# Patient Record
Sex: MALE | Race: BLACK OR AFRICAN AMERICAN | NOT HISPANIC OR LATINO | Employment: OTHER | URBAN - METROPOLITAN AREA
[De-identification: names, ages, dates, MRNs, and addresses within clinical notes are randomized per-mention and may not be internally consistent; named-entity substitution may affect disease eponyms.]

---

## 2017-11-05 ENCOUNTER — HOSPITAL ENCOUNTER (INPATIENT)
Facility: OTHER | Age: 82
LOS: 2 days | Discharge: HOME HEALTH | DRG: 682 | End: 2017-11-07
Attending: EMERGENCY MEDICINE | Admitting: INTERNAL MEDICINE
Payer: MEDICARE

## 2017-11-05 DIAGNOSIS — F02.818 DEMENTIA ASSOCIATED WITH OTHER UNDERLYING DISEASE WITH BEHAVIORAL DISTURBANCE: Primary | ICD-10-CM

## 2017-11-05 DIAGNOSIS — E87.0 HYPERNATREMIA: ICD-10-CM

## 2017-11-05 DIAGNOSIS — E86.0 DEHYDRATION: ICD-10-CM

## 2017-11-05 DIAGNOSIS — N28.9 RENAL INSUFFICIENCY: ICD-10-CM

## 2017-11-05 DIAGNOSIS — R53.83 FATIGUE: ICD-10-CM

## 2017-11-05 LAB
ALBUMIN SERPL BCP-MCNC: 3 G/DL
ALP SERPL-CCNC: 64 U/L
ALT SERPL W/O P-5'-P-CCNC: 21 U/L
ANION GAP SERPL CALC-SCNC: 10 MMOL/L
AST SERPL-CCNC: 19 U/L
BASOPHILS # BLD AUTO: 0 K/UL
BASOPHILS NFR BLD: 0 %
BILIRUB SERPL-MCNC: 0.6 MG/DL
BILIRUB UR QL STRIP: NEGATIVE
BUN SERPL-MCNC: 59 MG/DL
CALCIUM SERPL-MCNC: 9.6 MG/DL
CHLORIDE SERPL-SCNC: 117 MMOL/L
CLARITY UR: ABNORMAL
CO2 SERPL-SCNC: 25 MMOL/L
COLOR UR: YELLOW
CREAT SERPL-MCNC: 2.5 MG/DL
DIFFERENTIAL METHOD: ABNORMAL
EOSINOPHIL # BLD AUTO: 0.1 K/UL
EOSINOPHIL NFR BLD: 1.7 %
ERYTHROCYTE [DISTWIDTH] IN BLOOD BY AUTOMATED COUNT: 12.5 %
EST. GFR  (AFRICAN AMERICAN): 25 ML/MIN/1.73 M^2
EST. GFR  (NON AFRICAN AMERICAN): 21 ML/MIN/1.73 M^2
GLUCOSE SERPL-MCNC: 154 MG/DL
GLUCOSE UR QL STRIP: ABNORMAL
HCT VFR BLD AUTO: 36.2 %
HGB BLD-MCNC: 12 G/DL
HGB UR QL STRIP: NEGATIVE
KETONES UR QL STRIP: NEGATIVE
LEUKOCYTE ESTERASE UR QL STRIP: NEGATIVE
LYMPHOCYTES # BLD AUTO: 2.3 K/UL
LYMPHOCYTES NFR BLD: 31.3 %
MAGNESIUM SERPL-MCNC: 2.5 MG/DL
MCH RBC QN AUTO: 31.7 PG
MCHC RBC AUTO-ENTMCNC: 33.1 G/DL
MCV RBC AUTO: 96 FL
MONOCYTES # BLD AUTO: 0.4 K/UL
MONOCYTES NFR BLD: 5.2 %
NEUTROPHILS # BLD AUTO: 4.6 K/UL
NEUTROPHILS NFR BLD: 61.5 %
NITRITE UR QL STRIP: NEGATIVE
PH UR STRIP: 6 [PH] (ref 5–8)
PLATELET # BLD AUTO: 140 K/UL
PMV BLD AUTO: 11 FL
POCT GLUCOSE: 167 MG/DL (ref 70–110)
POCT GLUCOSE: 253 MG/DL (ref 70–110)
POTASSIUM SERPL-SCNC: 4.2 MMOL/L
PROT SERPL-MCNC: 8.4 G/DL
PROT UR QL STRIP: ABNORMAL
RBC # BLD AUTO: 3.79 M/UL
SODIUM SERPL-SCNC: 152 MMOL/L
SP GR UR STRIP: 1.02 (ref 1–1.03)
URN SPEC COLLECT METH UR: ABNORMAL
UROBILINOGEN UR STRIP-ACNC: NEGATIVE EU/DL
WBC # BLD AUTO: 7.44 K/UL

## 2017-11-05 PROCEDURE — 93005 ELECTROCARDIOGRAM TRACING: CPT

## 2017-11-05 PROCEDURE — 81003 URINALYSIS AUTO W/O SCOPE: CPT

## 2017-11-05 PROCEDURE — 11000001 HC ACUTE MED/SURG PRIVATE ROOM

## 2017-11-05 PROCEDURE — 82962 GLUCOSE BLOOD TEST: CPT

## 2017-11-05 PROCEDURE — 83735 ASSAY OF MAGNESIUM: CPT

## 2017-11-05 PROCEDURE — 25000003 PHARM REV CODE 250: Performed by: INTERNAL MEDICINE

## 2017-11-05 PROCEDURE — 25000003 PHARM REV CODE 250: Performed by: EMERGENCY MEDICINE

## 2017-11-05 PROCEDURE — 99285 EMERGENCY DEPT VISIT HI MDM: CPT

## 2017-11-05 PROCEDURE — 63600175 PHARM REV CODE 636 W HCPCS: Performed by: INTERNAL MEDICINE

## 2017-11-05 PROCEDURE — 80053 COMPREHEN METABOLIC PANEL: CPT

## 2017-11-05 PROCEDURE — 85025 COMPLETE CBC W/AUTO DIFF WBC: CPT

## 2017-11-05 PROCEDURE — P9612 CATHETERIZE FOR URINE SPEC: HCPCS

## 2017-11-05 PROCEDURE — 96360 HYDRATION IV INFUSION INIT: CPT

## 2017-11-05 PROCEDURE — S5010 5% DEXTROSE AND 0.45% SALINE: HCPCS | Performed by: INTERNAL MEDICINE

## 2017-11-05 PROCEDURE — 93010 ELECTROCARDIOGRAM REPORT: CPT | Mod: ,,, | Performed by: INTERNAL MEDICINE

## 2017-11-05 RX ORDER — HEPARIN SODIUM 5000 [USP'U]/ML
5000 INJECTION, SOLUTION INTRAVENOUS; SUBCUTANEOUS EVERY 8 HOURS
Status: DISCONTINUED | OUTPATIENT
Start: 2017-11-05 | End: 2017-11-07 | Stop reason: HOSPADM

## 2017-11-05 RX ORDER — SODIUM CHLORIDE 9 MG/ML
INJECTION, SOLUTION INTRAVENOUS CONTINUOUS
Status: DISCONTINUED | OUTPATIENT
Start: 2017-11-05 | End: 2017-11-07 | Stop reason: HOSPADM

## 2017-11-05 RX ORDER — DEXTROSE MONOHYDRATE AND SODIUM CHLORIDE 5; .45 G/100ML; G/100ML
INJECTION, SOLUTION INTRAVENOUS CONTINUOUS
Status: DISCONTINUED | OUTPATIENT
Start: 2017-11-05 | End: 2017-11-06

## 2017-11-05 RX ORDER — AMOXICILLIN 250 MG
1 CAPSULE ORAL 2 TIMES DAILY
Status: DISCONTINUED | OUTPATIENT
Start: 2017-11-05 | End: 2017-11-07 | Stop reason: HOSPADM

## 2017-11-05 RX ORDER — ASPIRIN 81 MG/1
81 TABLET ORAL DAILY
Status: DISCONTINUED | OUTPATIENT
Start: 2017-11-06 | End: 2017-11-07 | Stop reason: HOSPADM

## 2017-11-05 RX ORDER — AMLODIPINE BESYLATE 5 MG/1
5 TABLET ORAL DAILY
COMMUNITY
End: 2017-12-12 | Stop reason: SDUPTHER

## 2017-11-05 RX ORDER — GLIMEPIRIDE 4 MG/1
4 TABLET ORAL
Status: ON HOLD | COMMUNITY
End: 2017-11-06

## 2017-11-05 RX ORDER — ASPIRIN 81 MG/1
81 TABLET ORAL DAILY
COMMUNITY
End: 2017-12-12 | Stop reason: SDUPTHER

## 2017-11-05 RX ADMIN — HEPARIN SODIUM 5000 UNITS: 5000 INJECTION, SOLUTION INTRAVENOUS; SUBCUTANEOUS at 09:11

## 2017-11-05 RX ADMIN — DEXTROSE AND SODIUM CHLORIDE: 5; .45 INJECTION, SOLUTION INTRAVENOUS at 05:11

## 2017-11-05 RX ADMIN — SODIUM CHLORIDE 1000 ML: 0.9 INJECTION, SOLUTION INTRAVENOUS at 02:11

## 2017-11-05 RX ADMIN — STANDARDIZED SENNA CONCENTRATE AND DOCUSATE SODIUM 1 TABLET: 8.6; 5 TABLET, FILM COATED ORAL at 09:11

## 2017-11-05 NOTE — ED TRIAGE NOTES
Pt's daughter states pt was put in nursing home on Wednesday, states that today she saw a wound on his heel that was not present on Thursday.  PT's daughter sent pt to ER due to decreased appetite, decreased urination, and wound on heel.  Pt's daughter reports pt has hx of diabetes and Alzheimer's.

## 2017-11-05 NOTE — ED NOTES
Patient identifiers verified and correct for [facility]..    LOC: The patient is awake, alert and aware of environment with an appropriate affect, the patient is oriented x 3 and speaking appropriately.  APPEARANCE: Patient resting comfortably and in no acute distress, patient is clean and well groomed, patient's clothing is properly fastened.  SKIN: The skin is warm and dry, color consistent with ethnicity, +2cm by 2cm wound to sacrum, 6cm by 7cm pressure wound to right heel with serosanguinous drainage  MUSCULOSKELETAL: Patient moving all extremities spontaneously, no obvious swelling or deformities noted.  RESPIRATORY: Airway is open and patent, respirations are spontaneous, patient has a normal effort and rate, no accessory muscle use noted, bilateral breath sounds diminished with poor effort.  CARDIAC: Patient has a normal rate and regular rhythm, no periphreal edema noted, capillary refill < 3 seconds.  ABDOMEN: Soft and non tender to palpation, no distention noted.  NEUROLOGIC: Eyes open spontaneously, behavior appropriate to situation, follows commands, facial expression symmetrical, purposeful motor response noted, +dementia, +fatigue

## 2017-11-05 NOTE — PLAN OF CARE
11/05/17 1701   Discharge Assessment   Assessment Type Discharge Planning Assessment   Confirmed/corrected address and phone number on facesheet? Yes   Assessment information obtained from? Patient;Caregiver;Medical Record   Prior to hospitilization cognitive status: Not Oriented to Time   Prior to hospitalization functional status: Assistive Equipment   Current cognitive status: Not Oriented to Time   Current Functional Status: Assistive Equipment;Needs Assistance   Lives With other (see comments)  ([facility])   Able to Return to Prior Arrangements no  (family will resume care, NOT returning to NH)   Patient currently being followed by outpatient case management? No   Equipment Currently Used at Home other (see comments)  ( was in [facility] for 3 days per family)   Do you have any problems affording any of your prescribed medications? No   Is the patient taking medications as prescribed? yes   Does the patient have transportation home? Yes   Transportation Available family or friend will provide   Discharge Plan A Home with family   Patient/Family In Agreement With Plan yes

## 2017-11-05 NOTE — ED PROVIDER NOTES
"Encounter Date: 11/5/2017    SCRIBE #1 NOTE: I, Jannette Esequieljoshua, am scribing for, and in the presence of, Dr. Schroeder.       History     Chief Complaint   Patient presents with    Fatigue     brought by SANCHEZ from [facility] for fatigue according to daughter, reported decreased urine output, hx of dementia, AAOx1 which is baseline per daughter     Time seen by provider: 1:01 PM    This is a 89 y.o. male, with Hx of dementia, who presents with complaint of fatigue per SANCHEZ from [facility]. Per daughter, pt is AAOx1. Pt states he is "feeling pretty good." and denies fever, chills, cough, abdominal pain, nausea,vomiting, diarrhea, constipation, headaches, or myalgias. HPI is limited by age and dementia of pt. Additional history obtained with arrival of two daughters who endorse other sister had been caring for their father x 8 years up in , but recently brought him to a nursing home last week. Now concerned that pt has had decreased appetite, and had not had his clothes changes and new wound to R heel.       The history is provided by the patient.     Review of patient's allergies indicates:  No Known Allergies  Past Medical History:   Diagnosis Date    Dementia     Diabetes mellitus     Hypertension      Past Surgical History:   Procedure Laterality Date    HERNIA REPAIR       History reviewed. No pertinent family history.  Social History   Substance Use Topics    Smoking status: Never Smoker    Smokeless tobacco: Never Used    Alcohol use No     Review of Systems   Reason unable to perform ROS: Limited by age and dementia.       Physical Exam     Initial Vitals [11/05/17 1231]   BP Pulse Resp Temp SpO2   131/61 69 16 98.1 °F (36.7 °C) 97 %      MAP       84.33         Physical Exam    Nursing note and vitals reviewed.  Constitutional: He appears well-developed and well-nourished. No distress.   Non-toxic, mod dementia, alert to person and place   HENT:   Head: Normocephalic and atraumatic.   Eyes: " Conjunctivae and EOM are normal. Pupils are equal, round, and reactive to light.   Neck: Normal range of motion. Neck supple.   Cardiovascular: Normal rate, regular rhythm and normal heart sounds.   Pulmonary/Chest: Effort normal and breath sounds normal. No respiratory distress.   Abdominal: Soft. Normal appearance and bowel sounds are normal. There is no tenderness.   Musculoskeletal: Normal range of motion.   2 x 2 cm pressure wound to right heel, diffuse atrophy x 4 limbs   Neurological: He is alert. No cranial nerve deficit or sensory deficit.   Skin: Skin is warm and dry.   Psychiatric: His behavior is normal.         ED Course   Procedures  Labs Reviewed   CBC W/ AUTO DIFFERENTIAL - Abnormal; Notable for the following:        Result Value    RBC 3.79 (*)     Hemoglobin 12.0 (*)     Hematocrit 36.2 (*)     MCH 31.7 (*)     Platelets 140 (*)     All other components within normal limits   COMPREHENSIVE METABOLIC PANEL - Abnormal; Notable for the following:     Sodium 152 (*)     Chloride 117 (*)     Glucose 154 (*)     BUN, Bld 59 (*)     Creatinine 2.5 (*)     Albumin 3.0 (*)     eGFR if  25 (*)     eGFR if non  21 (*)     All other components within normal limits   URINALYSIS - Abnormal; Notable for the following:     Appearance, UA Hazy (*)     Protein, UA Trace (*)     Glucose, UA Trace (*)     All other components within normal limits   POCT GLUCOSE - Abnormal; Notable for the following:     POCT Glucose 167 (*)     All other components within normal limits   MAGNESIUM   POCT GLUCOSE MONITORING CONTINUOUS     EKG Readings: (Independently Interpreted)   Initial Reading: No STEMI.   Normal Sinus Rhythm of 65 bpm. Incomplete bundle branch block. Artifact present.       X-Rays:   Independently Interpreted Readings:   Chest X-Ray: No infiltrates.  No acute abnormalities. No cardiomegaly present.     Medical Decision Making:   Initial Assessment:   Urgent evaluation of 89-year-old  gentleman w dementia and NIDDM sent from [facility] given concern for decreased appetite and decreased urinary oupt. Here pt has no complaints, is well appearing, non toxic and answering simple questions appropriately, oriented x 2. Will eval for dehydration, metabolic disturbance, uti, gently hydrate and d/w family. Pt is full code per ROSMERY, but daughters here requesting to rescind that order, and have assumed decision making role, and requesting new DNR establishment today.   Clinical Tests:   Lab Tests: Ordered and Reviewed  Radiological Study: Ordered and Reviewed  Medical Tests: Ordered and Reviewed  ED Management:  Labs notable for elevated Cr, 2.5 with unknown baseline, and hypernatremia  Will plan to admit for gentle hydration, Case management referral for initiation of hospice/home health initiation.     Imaging Results          X-Ray Chest 1 View (Final result)  Result time 11/05/17 13:45:24    Final result by Rajiv Prasad MD (11/05/17 13:45:24)                 Impression:      No acute cardiopulmonary process.      Electronically signed by: RAJIV PRASAD MD  Date:     11/05/17  Time:    13:45              Narrative:    Chest one view    Indication:Fatigue    Comparison:None    Findings:  The cardiomediastinal silhouette is not enlarged.  There is no pleural effusion.  The trachea is midline.  The lungs are symmetrically expanded bilaterally with minimally coarse interstitial attenuation. No large focal consolidation seen.  There is no pneumothorax.  The osseous structures are remarkable for degenerative changes.                                      Scribe Attestation:   Scribe #1: I performed the above scribed service and the documentation accurately describes the services I performed. I attest to the accuracy of the note.    I, Dr. Karen Schroeder, personally performed the services described in this documentation. All medical record entries made by the scribe were at my direction and in my  presence.  I have reviewed the chart and agree that the record reflects my personal performance and is accurate and complete. Karen Schroeder MD.  5:05 PM 11/05/2017          ED Course      Clinical Impression:     1. Dehydration    2. Fatigue    3. Hypernatremia    4. Renal insufficiency          Disposition:   Disposition: Discharged  Condition: Stable                        Karen Schroeder MD  11/05/17 2124

## 2017-11-06 PROBLEM — L89.613 DECUBITUS ULCER OF RIGHT HEEL, STAGE 3: Status: ACTIVE | Noted: 2017-11-06

## 2017-11-06 PROBLEM — N17.0 ACUTE RENAL FAILURE WITH TUBULAR NECROSIS: Status: ACTIVE | Noted: 2017-11-06

## 2017-11-06 PROBLEM — F02.818 DEMENTIA ASSOCIATED WITH OTHER UNDERLYING DISEASE WITH BEHAVIORAL DISTURBANCE: Status: ACTIVE | Noted: 2017-11-06

## 2017-11-06 LAB
ALBUMIN SERPL BCP-MCNC: 2.6 G/DL
ALP SERPL-CCNC: 66 U/L
ALT SERPL W/O P-5'-P-CCNC: 19 U/L
ANION GAP SERPL CALC-SCNC: 7 MMOL/L
AST SERPL-CCNC: 20 U/L
BASOPHILS # BLD AUTO: 0.01 K/UL
BASOPHILS NFR BLD: 0.2 %
BILIRUB SERPL-MCNC: 0.5 MG/DL
BUN SERPL-MCNC: 47 MG/DL
CALCIUM SERPL-MCNC: 8.8 MG/DL
CHLORIDE SERPL-SCNC: 114 MMOL/L
CO2 SERPL-SCNC: 23 MMOL/L
CREAT SERPL-MCNC: 2.2 MG/DL
DIFFERENTIAL METHOD: ABNORMAL
EOSINOPHIL # BLD AUTO: 0.1 K/UL
EOSINOPHIL NFR BLD: 1.3 %
ERYTHROCYTE [DISTWIDTH] IN BLOOD BY AUTOMATED COUNT: 12.1 %
EST. GFR  (AFRICAN AMERICAN): 29 ML/MIN/1.73 M^2
EST. GFR  (NON AFRICAN AMERICAN): 25 ML/MIN/1.73 M^2
ESTIMATED AVG GLUCOSE: 286 MG/DL
GLUCOSE SERPL-MCNC: 438 MG/DL
HBA1C MFR BLD HPLC: 11.6 %
HCT VFR BLD AUTO: 33.3 %
HGB BLD-MCNC: 11.2 G/DL
LYMPHOCYTES # BLD AUTO: 1.6 K/UL
LYMPHOCYTES NFR BLD: 30.6 %
MCH RBC QN AUTO: 31.5 PG
MCHC RBC AUTO-ENTMCNC: 33.6 G/DL
MCV RBC AUTO: 94 FL
MONOCYTES # BLD AUTO: 0.3 K/UL
MONOCYTES NFR BLD: 5.2 %
NEUTROPHILS # BLD AUTO: 3.4 K/UL
NEUTROPHILS NFR BLD: 62.5 %
PLATELET # BLD AUTO: 111 K/UL
PMV BLD AUTO: 11.3 FL
POCT GLUCOSE: 275 MG/DL (ref 70–110)
POCT GLUCOSE: 337 MG/DL (ref 70–110)
POCT GLUCOSE: 460 MG/DL (ref 70–110)
POCT GLUCOSE: 95 MG/DL (ref 70–110)
POTASSIUM SERPL-SCNC: 4.1 MMOL/L
PROT SERPL-MCNC: 7.5 G/DL
RBC # BLD AUTO: 3.56 M/UL
SODIUM SERPL-SCNC: 144 MMOL/L
WBC # BLD AUTO: 5.36 K/UL

## 2017-11-06 PROCEDURE — 80053 COMPREHEN METABOLIC PANEL: CPT

## 2017-11-06 PROCEDURE — 36415 COLL VENOUS BLD VENIPUNCTURE: CPT

## 2017-11-06 PROCEDURE — S5010 5% DEXTROSE AND 0.45% SALINE: HCPCS | Performed by: INTERNAL MEDICINE

## 2017-11-06 PROCEDURE — 25000003 PHARM REV CODE 250: Performed by: EMERGENCY MEDICINE

## 2017-11-06 PROCEDURE — 85025 COMPLETE CBC W/AUTO DIFF WBC: CPT

## 2017-11-06 PROCEDURE — 83036 HEMOGLOBIN GLYCOSYLATED A1C: CPT

## 2017-11-06 PROCEDURE — 63600175 PHARM REV CODE 636 W HCPCS: Performed by: PHYSICIAN ASSISTANT

## 2017-11-06 PROCEDURE — 63600175 PHARM REV CODE 636 W HCPCS: Performed by: INTERNAL MEDICINE

## 2017-11-06 PROCEDURE — 99223 1ST HOSP IP/OBS HIGH 75: CPT | Mod: AI,,, | Performed by: INTERNAL MEDICINE

## 2017-11-06 PROCEDURE — 25000003 PHARM REV CODE 250: Performed by: INTERNAL MEDICINE

## 2017-11-06 PROCEDURE — 11000001 HC ACUTE MED/SURG PRIVATE ROOM

## 2017-11-06 RX ORDER — GLIMEPIRIDE 4 MG/1
2 TABLET ORAL
Qty: 15 TABLET | Refills: 0 | Status: SHIPPED | OUTPATIENT
Start: 2017-11-06 | End: 2017-12-12 | Stop reason: SDUPTHER

## 2017-11-06 RX ORDER — GLUCAGON 1 MG
1 KIT INJECTION
Status: DISCONTINUED | OUTPATIENT
Start: 2017-11-06 | End: 2017-11-07 | Stop reason: HOSPADM

## 2017-11-06 RX ORDER — HYDRALAZINE HYDROCHLORIDE 20 MG/ML
10 INJECTION INTRAMUSCULAR; INTRAVENOUS EVERY 8 HOURS PRN
Status: DISCONTINUED | OUTPATIENT
Start: 2017-11-06 | End: 2017-11-07 | Stop reason: HOSPADM

## 2017-11-06 RX ORDER — IBUPROFEN 200 MG
24 TABLET ORAL
Status: DISCONTINUED | OUTPATIENT
Start: 2017-11-06 | End: 2017-11-07 | Stop reason: HOSPADM

## 2017-11-06 RX ORDER — INSULIN ASPART 100 [IU]/ML
1-10 INJECTION, SOLUTION INTRAVENOUS; SUBCUTANEOUS
Status: DISCONTINUED | OUTPATIENT
Start: 2017-11-06 | End: 2017-11-07 | Stop reason: HOSPADM

## 2017-11-06 RX ORDER — IBUPROFEN 200 MG
16 TABLET ORAL
Status: DISCONTINUED | OUTPATIENT
Start: 2017-11-06 | End: 2017-11-07 | Stop reason: HOSPADM

## 2017-11-06 RX ORDER — INSULIN ASPART 100 [IU]/ML
5 INJECTION, SOLUTION INTRAVENOUS; SUBCUTANEOUS ONCE
Status: COMPLETED | OUTPATIENT
Start: 2017-11-06 | End: 2017-11-06

## 2017-11-06 RX ORDER — INSULIN ASPART 100 [IU]/ML
0-5 INJECTION, SOLUTION INTRAVENOUS; SUBCUTANEOUS
Status: DISCONTINUED | OUTPATIENT
Start: 2017-11-06 | End: 2017-11-06

## 2017-11-06 RX ADMIN — INSULIN ASPART 5 UNITS: 100 INJECTION, SOLUTION INTRAVENOUS; SUBCUTANEOUS at 01:11

## 2017-11-06 RX ADMIN — SODIUM CHLORIDE: 0.9 INJECTION, SOLUTION INTRAVENOUS at 01:11

## 2017-11-06 RX ADMIN — HEPARIN SODIUM 5000 UNITS: 5000 INJECTION, SOLUTION INTRAVENOUS; SUBCUTANEOUS at 08:11

## 2017-11-06 RX ADMIN — INSULIN DETEMIR 5 UNITS: 100 INJECTION, SOLUTION SUBCUTANEOUS at 12:11

## 2017-11-06 RX ADMIN — SODIUM CHLORIDE: 0.9 INJECTION, SOLUTION INTRAVENOUS at 08:11

## 2017-11-06 RX ADMIN — STANDARDIZED SENNA CONCENTRATE AND DOCUSATE SODIUM 1 TABLET: 8.6; 5 TABLET, FILM COATED ORAL at 09:11

## 2017-11-06 RX ADMIN — HYDRALAZINE HYDROCHLORIDE 10 MG: 20 INJECTION INTRAMUSCULAR; INTRAVENOUS at 10:11

## 2017-11-06 RX ADMIN — STANDARDIZED SENNA CONCENTRATE AND DOCUSATE SODIUM 1 TABLET: 8.6; 5 TABLET, FILM COATED ORAL at 08:11

## 2017-11-06 RX ADMIN — HEPARIN SODIUM 5000 UNITS: 5000 INJECTION, SOLUTION INTRAVENOUS; SUBCUTANEOUS at 01:11

## 2017-11-06 RX ADMIN — HEPARIN SODIUM 5000 UNITS: 5000 INJECTION, SOLUTION INTRAVENOUS; SUBCUTANEOUS at 05:11

## 2017-11-06 RX ADMIN — INSULIN ASPART 5 UNITS: 100 INJECTION, SOLUTION INTRAVENOUS; SUBCUTANEOUS at 12:11

## 2017-11-06 RX ADMIN — DEXTROSE AND SODIUM CHLORIDE: 5; .45 INJECTION, SOLUTION INTRAVENOUS at 05:11

## 2017-11-06 RX ADMIN — INSULIN ASPART 8 UNITS: 100 INJECTION, SOLUTION INTRAVENOUS; SUBCUTANEOUS at 03:11

## 2017-11-06 RX ADMIN — ASPIRIN 81 MG: 81 TABLET, COATED ORAL at 08:11

## 2017-11-06 NOTE — PLAN OF CARE
Discharge Planning:  Patient admitted on 11-5-17  LOS-day 1  Chart reviewed  Care plan discussed  Discussed care plan with treatment team  Discussed care plan with the attending Dr Lyn  Current dispo - home hospice tomorrow  [facility] cosulted today  Nayeli with  [facility] met with Mr Crowe and his two dtr's (Sarah and Nevin)  Nevin ricardo Smith's address for discharge is:  [address]  [place] [address]    Case management  to follow  Consults following are: wound care, case mgt.,

## 2017-11-06 NOTE — H&P
History & Physical  Hospital Medicine      SUBJECTIVE:     Chief Complaint/Reason for Admission: Dehydration    History of Present Illness:  Bennett Ramírez is a 89 y.o. male who presents with altered mental status and fatigue. He has been in PACE and living with his daughter in  for the past 6 yrs. She became overwhelmed with his care and decided to admit him to a nursing home in [place]. He became more confused and developed a pressure sore on his right heel. The family decided to bring him to the ED for evaluation  He is found to have an elevated sodium and was admitted for dehydration and acute renal failure.     This AM the pt. Is feeling better, the daughter is feeding him a soft diet without difficulties.        Review of patient's allergies indicates:  No Known Allergies     Past Medical History:   Diagnosis Date    Dementia     Diabetes mellitus     Hypertension      Past Surgical History:   Procedure Laterality Date    HERNIA REPAIR       History reviewed. No pertinent family history.  Social History   Substance Use Topics    Smoking status: Never Smoker    Smokeless tobacco: Never Used    Alcohol use No       Review of Systems:  Review of Systems   Constitutional: Negative for chills and fever.   HENT: Negative for hearing loss.    Eyes: Negative for blurred vision, double vision and photophobia.   Respiratory: Negative for cough and hemoptysis.    Cardiovascular: Negative for chest pain and palpitations.   Gastrointestinal: Negative for abdominal pain, heartburn, nausea and vomiting.   Genitourinary: Negative for dysuria and urgency.   Musculoskeletal: Negative for myalgias.   Skin: Negative for rash.   Neurological: Negative for dizziness and headaches.   Psychiatric/Behavioral: Negative for depression.       OBJECTIVE:     Vital Signs (Most Recent)  Temp: 97.4 °F (36.3 °C) (11/06/17 0753)  Pulse: 76 (11/06/17 0800)  Resp: 18 (11/06/17 0753)  BP: (!) 152/92 (11/06/17 0753)  SpO2: 98 %  (11/06/17 0753)    Physical Exam:  Physical Exam   HENT:   Head: Normocephalic.   Neck: Normal range of motion. No JVD present.   Cardiovascular: Normal rate and regular rhythm.    No murmur heard.  Pulmonary/Chest: No respiratory distress.   Abdominal: He exhibits no distension. There is no tenderness.   Musculoskeletal: Normal range of motion. He exhibits no edema.   Neurological: He is alert.   Skin: He is not diaphoretic.         Laboratory  CBC:     Recent Labs  Lab 11/06/17  0650   WBC 5.36   RBC 3.56*   HGB 11.2*   HCT 33.3*   *   MCV 94   MCH 31.5*   MCHC 33.6       CMP:     Recent Labs  Lab 11/06/17  0650   *   CALCIUM 8.8   ALBUMIN 2.6*   PROT 7.5      K 4.1   CO2 23   *   BUN 47*   CREATININE 2.2*   ALKPHOS 66   ALT 19   AST 20   BILITOT 0.5         Diagnostic Results:  - Labs reviewed  - EKG reviewed  - X-Ray reviewed  - CT / MRI reviewed    ASSESSMENT/PLAN:     Active Hospital Problems    Diagnosis  POA    *Dehydration [E86.0]  Yes    Dementia associated with other underlying disease with behavioral disturbance [F02.81]  Yes    Decubitus ulcer of right heel, stage 3 [L89.613]  Yes    Uncontrolled type 2 diabetes mellitus without complication, without long-term current use of insulin [E11.65]  Yes    Acute renal failure with tubular necrosis [N17.0]  Yes      Resolved Hospital Problems    Diagnosis Date Resolved POA   No resolved problems to display.       1: Dehydration  - suspect poor oral intake related to dementia  - has now corrected with IVF  - discussed natural disease progression with family  - he will need frequent offerings of water and food as he is unable to feed himself  - close monitoring of volume status is needed to avoid recurrence.     2: ARF  - unknown baseline creatinine but suspect underlying CKD  - improved with IVF.   - strict monitoring of I/O  - avoid nephrotoxins.     3: DM2:  - family reports decent control   - uncontrolled this AM.   - takes  Amaryl at home  - use Levemir and ISS while admitted.     4: Dementia  - has had a gradual decline lately  - has been at [facility]ACE.   - Family had expressed wishes for DNR to ED physician, will confirm.     5: Plans for establishing PACE and return home to pt. Daughter's house.   - No results found for: LABA1C, HGBA1C

## 2017-11-06 NOTE — PLAN OF CARE
[facility]  [address] [place]. [address]           HOME  HEALTH ORDERS        Admit to Home Health    Diagnoses:  Active Hospital Problems    Diagnosis  POA    *Dehydration [E86.0]  Yes    Dementia associated with other underlying disease with behavioral disturbance [F02.81]  Yes    Decubitus ulcer of right heel, stage 3 [L89.613]  Yes    Uncontrolled type 2 diabetes mellitus without complication, without long-term current use of insulin [E11.65]  Yes    Acute renal failure with tubular necrosis [N17.0]  Yes      Resolved Hospital Problems    Diagnosis Date Resolved POA   No resolved problems to display.       Patient is homebound due to: Pt requires home health services due to taxing effort to leave the home as a result of weakness/debility from Dehydration    Face to face services were provided on 11/6/2017    Allergies:  Review of patient's allergies indicates:  No Known Allergies    Diet: 2000 adrianna ADA diet    Activity: as tolerated    Nursing:   SN to complete comprehensive assessment including routine vital signs. Instruct on disease process and s/s of complications to report to MD. Review/verify medication list sent home with the patient at time of discharge  and instruct patient/caregiver as needed. Frequency may be adjusted depending on start of care date.    Notify MD if SBP > 160 or < 90; DBP > 90 or < 50; HR > 120 or < 50; Temp > 101;     CONSULTS:     Physical Therapy to evaluate and treat. Evaluate for home safety and equipment needs; Establish/upgrade home exercise program. Perform / instruct on therapeutic exercises, gait training, transfer training, and Range of Motion.    Occupational Therapy to evaluate and treat. Evaluate home environment for safety and equipment needs. Perform/Instruct on transfers, ADL training, ROM, and therapeutic exercises.                                             Aide to provide assistance with personal care, ADLs, and vital  signs    DIABETES CARE:    SN to perform and educate Diabetic management with blood glucose monitoring:          Fingerstick blood sugar AC and HS      Report CBG < 60 or > 350 to physician.                                          Insulin Sliding Scale          Glucose  Novolog Insulin Subcutaneous        0 - 60   Orange juice or glucose tablet, hold insulin      No insulin   201-250  2 units   251-300  4 units   301-350  6 units   351-400  8 units   >400   10 units then call physician      Medications: Review discharge medications with patient and family and provide education.         Medication List      CHANGE how you take these medications    glimepiride 4 MG tablet  Commonly known as:  AMARYL  Take 0.5 tablets (2 mg total) by mouth daily with breakfast.  What changed:  · how much to take  · when to take this        CONTINUE taking these medications    amLODIPine 5 MG tablet  Commonly known as:  NORVASC     aspirin 81 MG EC tablet  Commonly known as:  ECOTRIN     mineral oil-hydrophil petrolat Oint     TRADJENTA 5 mg Tab tablet  Generic drug:  linagliptin           Where to Get Your Medications      You can get these medications from any pharmacy    Bring a paper prescription for each of these medications  · glimepiride 4 MG tablet           _________________________________  Nahun Lyn MD      11/6/2017

## 2017-11-06 NOTE — NURSING
assisted patient with feeding. He ate 50%of his mushroom soup, 100% of vanilla pudding, and 240ml of whole milk. Pt now resting. Call light within reach. WCTM

## 2017-11-06 NOTE — CONSULTS
Patient admitted with deep tissue injury to right heel.  Blister roof intact with purple area in central posterior heel.  Area measures 6 x 7 cm.  Patient heel dressed with foam heel dressing; kerlex wrap.  Heel boots for off loading heels placed.  Patient is diabetic and blood sugars have been poorly controlled.  Plan to discuss treatment options with Dr. Lyn.  The patient also has pigmentation changes to toes on both feet.  Right Heel:

## 2017-11-07 VITALS
DIASTOLIC BLOOD PRESSURE: 92 MMHG | WEIGHT: 123.88 LBS | BODY MASS INDEX: 17.34 KG/M2 | TEMPERATURE: 98 F | RESPIRATION RATE: 15 BRPM | OXYGEN SATURATION: 95 % | SYSTOLIC BLOOD PRESSURE: 158 MMHG | HEIGHT: 71 IN | HEART RATE: 92 BPM

## 2017-11-07 PROBLEM — G30.9 ALZHEIMER'S DEMENTIA WITHOUT BEHAVIORAL DISTURBANCE: Status: ACTIVE | Noted: 2017-11-07

## 2017-11-07 PROBLEM — F02.80 ALZHEIMER'S DEMENTIA WITHOUT BEHAVIORAL DISTURBANCE: Status: ACTIVE | Noted: 2017-11-07

## 2017-11-07 LAB
ALBUMIN SERPL BCP-MCNC: 2.5 G/DL
ALP SERPL-CCNC: 60 U/L
ALT SERPL W/O P-5'-P-CCNC: 15 U/L
ANION GAP SERPL CALC-SCNC: 6 MMOL/L
AST SERPL-CCNC: 19 U/L
BASOPHILS # BLD AUTO: 0 K/UL
BASOPHILS NFR BLD: 0 %
BILIRUB SERPL-MCNC: 0.5 MG/DL
BUN SERPL-MCNC: 31 MG/DL
CALCIUM SERPL-MCNC: 8.5 MG/DL
CHLORIDE SERPL-SCNC: 116 MMOL/L
CO2 SERPL-SCNC: 22 MMOL/L
CREAT SERPL-MCNC: 1.6 MG/DL
DIFFERENTIAL METHOD: ABNORMAL
EOSINOPHIL # BLD AUTO: 0.1 K/UL
EOSINOPHIL NFR BLD: 1 %
ERYTHROCYTE [DISTWIDTH] IN BLOOD BY AUTOMATED COUNT: 12.2 %
EST. GFR  (AFRICAN AMERICAN): 42 ML/MIN/1.73 M^2
EST. GFR  (NON AFRICAN AMERICAN): 37 ML/MIN/1.73 M^2
GLUCOSE SERPL-MCNC: 77 MG/DL
HCT VFR BLD AUTO: 33.7 %
HGB BLD-MCNC: 11.4 G/DL
LYMPHOCYTES # BLD AUTO: 2.4 K/UL
LYMPHOCYTES NFR BLD: 35.7 %
MCH RBC QN AUTO: 31.2 PG
MCHC RBC AUTO-ENTMCNC: 33.8 G/DL
MCV RBC AUTO: 92 FL
MONOCYTES # BLD AUTO: 0.4 K/UL
MONOCYTES NFR BLD: 5.3 %
NEUTROPHILS # BLD AUTO: 4 K/UL
NEUTROPHILS NFR BLD: 57.7 %
PLATELET # BLD AUTO: 117 K/UL
PMV BLD AUTO: 11.3 FL
POCT GLUCOSE: 71 MG/DL (ref 70–110)
POCT GLUCOSE: 96 MG/DL (ref 70–110)
POTASSIUM SERPL-SCNC: 4 MMOL/L
PROT SERPL-MCNC: 7.2 G/DL
RBC # BLD AUTO: 3.65 M/UL
SODIUM SERPL-SCNC: 144 MMOL/L
WBC # BLD AUTO: 6.84 K/UL

## 2017-11-07 PROCEDURE — 99239 HOSP IP/OBS DSCHRG MGMT >30: CPT | Mod: ,,, | Performed by: INTERNAL MEDICINE

## 2017-11-07 PROCEDURE — 80053 COMPREHEN METABOLIC PANEL: CPT

## 2017-11-07 PROCEDURE — 25000003 PHARM REV CODE 250: Performed by: INTERNAL MEDICINE

## 2017-11-07 PROCEDURE — 25000003 PHARM REV CODE 250: Performed by: EMERGENCY MEDICINE

## 2017-11-07 PROCEDURE — 85025 COMPLETE CBC W/AUTO DIFF WBC: CPT

## 2017-11-07 PROCEDURE — 36415 COLL VENOUS BLD VENIPUNCTURE: CPT

## 2017-11-07 PROCEDURE — 63600175 PHARM REV CODE 636 W HCPCS: Performed by: INTERNAL MEDICINE

## 2017-11-07 RX ADMIN — SODIUM CHLORIDE: 0.9 INJECTION, SOLUTION INTRAVENOUS at 04:11

## 2017-11-07 RX ADMIN — ASPIRIN 81 MG: 81 TABLET, COATED ORAL at 08:11

## 2017-11-07 RX ADMIN — STANDARDIZED SENNA CONCENTRATE AND DOCUSATE SODIUM 1 TABLET: 8.6; 5 TABLET, FILM COATED ORAL at 08:11

## 2017-11-07 RX ADMIN — INSULIN DETEMIR 5 UNITS: 100 INJECTION, SOLUTION SUBCUTANEOUS at 08:11

## 2017-11-07 RX ADMIN — HEPARIN SODIUM 5000 UNITS: 5000 INJECTION, SOLUTION INTRAVENOUS; SUBCUTANEOUS at 04:11

## 2017-11-07 NOTE — PLAN OF CARE
DC Dispo:    Patient to D/C home with [facility] to his daughter's home at [address] [place] [address]. Per Helen from [facility], all DME was delivered this morning. Writer spoke with Suleiman from [facility] [phone],  ETA 1800 per daughter's request.      11/07/17 1601   Final Note   Assessment Type Final Discharge Note   Discharge Disposition [facility]   Discharge plans and expectations educations in teach back method with documentation complete? Yes

## 2017-11-07 NOTE — PLAN OF CARE
[facility]  [address] [place]. 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       Vital Signs: Routine.    Allergies:No Known Allergies    Diet: pleasure feeding. 2000 adrianna ADA    Activities: As tolerated    Nursing: Per Hospice Routine    Medications:        Medication List      CHANGE how you take these medications    glimepiride 4 MG tablet  Commonly known as:  AMARYL  Take 0.5 tablets (2 mg total) by mouth daily with breakfast.  What changed:  · how much to take  · when to take this        CONTINUE taking these medications    amLODIPine 5 MG tablet  Commonly known as:  NORVASC     aspirin 81 MG EC tablet  Commonly known as:  ECOTRIN     mineral oil-hydrophil petrolat Oint     TRADJENTA 5 mg Tab tablet  Generic drug:  linagliptin           Where to Get Your Medications      You can get these medications from any pharmacy    Bring a paper prescription for each of these medications  · glimepiride 4 MG tablet             _________________________________  Nahun Lyn MD  11/7/2017

## 2017-11-07 NOTE — DISCHARGE SUMMARY
Discharge Summary  Hospital Medicine      Admit Date: 11/5/2017    Discharge Date and Time: 11/7/2017 1:12 PM    Discharge Attending Physician: Nahun Lyn MD     Diagnoses:  Active Hospital Problems    Diagnosis  POA    *Alzheimer's dementia without behavioral disturbance [G30.9, F02.80]  Yes    Dementia associated with other underlying disease with behavioral disturbance [F02.81]  Yes    Decubitus ulcer of right heel, stage 3 [L89.613]  Yes    Uncontrolled type 2 diabetes mellitus without complication, without long-term current use of insulin [E11.65]  Yes    Acute renal failure with tubular necrosis [N17.0]  Yes    Dehydration [E86.0]  Yes      Resolved Hospital Problems    Diagnosis Date Resolved POA   No resolved problems to display.       Discharged Condition: stable    Hospital Course: Bennett Ramírez is a 89 y.o. male who presents with altered mental status and fatigue. He has been in PACE and living with his daughter in  for the past 6 yrs. She became overwhelmed with his care and decided to admit him to a nursing home in [place]. He became more confused and developed a pressure sore on his right heel. The family decided to bring him to the ED for evaluation  He is found to have an elevated sodium and was admitted for dehydration and acute renal failure.      This AM the pt. Is feeling better, the daughter is feeding him a soft diet without difficulties.       1: Dehydration due to advanced alzheimer's dementia  - suspect poor oral intake related to dementia  - has now corrected with IVF  - appropriate for home hospice services.      2: ARF  - unknown baseline creatinine but suspect underlying CKD  - improved with IVF.   - now about at baseline     3: DM2:  - family reports decent control   - if good oral intake can continue Sulfonylurea      4: Dementia  - has had a gradual decline lately  - has been at PACE.   - family wishes hospice services.       - No results found for: LABA1C,  HGBA1C  Consults: None    Significant Diagnostic Studies:   CBC:     Recent Labs  Lab 11/07/17  0634   WBC 6.84   RBC 3.65*   HGB 11.4*   HCT 33.7*   *   MCV 92   MCH 31.2*   MCHC 33.8       CMP:     Recent Labs  Lab 11/07/17  0634   GLU 77   CALCIUM 8.5*   ALBUMIN 2.5*   PROT 7.2      K 4.0   CO2 22*   *   BUN 31*   CREATININE 1.6*   ALKPHOS 60   ALT 15   AST 19   BILITOT 0.5       Special Treatments/Procedures: none    Disposition: Hospice/Home    Diet: 2000 adrianna ADA    Activity: as tolerated    Patient Instructions:   Reconciled Home Medications:   Current Discharge Medication List      CONTINUE these medications which have CHANGED    Details   glimepiride (AMARYL) 4 MG tablet Take 0.5 tablets (2 mg total) by mouth daily with breakfast.  Qty: 15 tablet, Refills: 0         CONTINUE these medications which have NOT CHANGED    Details   amLODIPine (NORVASC) 5 MG tablet Take 5 mg by mouth once daily.      aspirin (ECOTRIN) 81 MG EC tablet Take 81 mg by mouth once daily.      linagliptin (TRADJENTA) 5 mg Tab tablet Take 5 mg by mouth once daily.      mineral oil-hydrophil petrolat Oint Apply topically as needed.               Discharge Procedure Orders  Diet Diabetic 2000 Calories     Activity as tolerated     Call MD for:  temperature >100.4     Call MD for:  severe uncontrolled pain     Call MD for:  difficulty breathing or increased cough     Call MD for:  severe persistent headache     Call MD for:  increased confusion or weakness     No dressing needed         Follow-up Information     [facility].    Specialty:  Hospice and Palliative Medicine  Why:  As needed  Contact information:  [address] [place] [id number] [phone]

## 2017-11-07 NOTE — PLAN OF CARE
2758-Writer received call from Helen at [facility] [phone] informing writer that per new Medicare guidelines, diagnosis of Dementia no longer qualifies for hospice, only Alzheimer's. Dr. Lyn informed of this new guideline with respect to orders for Home Hospice.

## 2017-11-08 LAB — POCT GLUCOSE: >500 MG/DL (ref 70–110)

## 2017-11-08 NOTE — PHYSICIAN QUERY
PT Name: Bennett Crowe Sr.  MR #: [id number]     Physician Query Form - Documentation Clarification      CDS/: Mercy Gomes               Contact information:[email or web address]    This form is a permanent document in the medical record.     Query Date: November 8, 2017    By submitting this query, we are merely seeking further clarification of documentation. Please utilize your independent clinical judgment when addressing the question(s) below.    The Medical record reflects the following:    Supporting Clinical Findings Location in Medical Record   Decubitus ulcer of right heel, stage 3 [L89.613]     6cm by 7cm pressure wound to right heel with serosanguinous drainage     Patient admitted with deep tissue injury to right heel.  Blister roof intact with purple area in central posterior heel.  Area measures 6 x 7 cm.  Patient heel dressed with foam heel  dressing; kerlex wrap.  Heel boots for off loading heels placed.  Patient is diabetic and blood sugars have been poorly controlled.  Plan to discuss treatment options with Dr. Lyn.  The patient also has pigmentation changes to toes on both feet.   Right Heel:    H&P under active problem list      ED nurse note 11-5      Wound care nurse 11-6                                                                                  Doctor, Please specify diagnosis or diagnoses associated with above clinical findings.  Please further specify the diagnoses.    Provider Use Only      [  x  ]  Decubitus ulcer of right heel, stage 3    [    ] Deep tissue injury to right heel.      [    ]  Other___________________________                                                                                                             [  ] Clinically undetermined

## 2017-11-08 NOTE — PLAN OF CARE
Problem: Patient Care Overview  Goal: Plan of Care Review  Outcome: Ongoing (interventions implemented as appropriate)  Patient discharged via [facility] at this time. No issues. IV removed. Tele monitor removed and returned. No issues noted at this time.

## 2017-12-12 ENCOUNTER — LAB VISIT (OUTPATIENT)
Dept: LAB | Facility: HOSPITAL | Age: 82
End: 2017-12-12
Attending: INTERNAL MEDICINE
Payer: MEDICARE

## 2017-12-12 ENCOUNTER — OFFICE VISIT (OUTPATIENT)
Dept: INTERNAL MEDICINE | Facility: CLINIC | Age: 82
End: 2017-12-12
Payer: MEDICARE

## 2017-12-12 VITALS
HEART RATE: 85 BPM | BODY MASS INDEX: 18.96 KG/M2 | OXYGEN SATURATION: 89 % | WEIGHT: 140 LBS | SYSTOLIC BLOOD PRESSURE: 164 MMHG | DIASTOLIC BLOOD PRESSURE: 100 MMHG | HEIGHT: 72 IN | TEMPERATURE: 99 F | RESPIRATION RATE: 16 BRPM

## 2017-12-12 DIAGNOSIS — F02.80 LATE ONSET ALZHEIMER'S DISEASE WITHOUT BEHAVIORAL DISTURBANCE: ICD-10-CM

## 2017-12-12 DIAGNOSIS — F02.818 DEMENTIA ASSOCIATED WITH OTHER UNDERLYING DISEASE WITH BEHAVIORAL DISTURBANCE: ICD-10-CM

## 2017-12-12 DIAGNOSIS — Z76.89 ENCOUNTER TO ESTABLISH CARE WITH NEW DOCTOR: ICD-10-CM

## 2017-12-12 DIAGNOSIS — G30.1 LATE ONSET ALZHEIMER'S DISEASE WITHOUT BEHAVIORAL DISTURBANCE: ICD-10-CM

## 2017-12-12 DIAGNOSIS — I10 HYPERTENSION, UNSPECIFIED TYPE: ICD-10-CM

## 2017-12-12 DIAGNOSIS — L89.613 DECUBITUS ULCER OF RIGHT HEEL, STAGE 3: ICD-10-CM

## 2017-12-12 DIAGNOSIS — Z00.00 ANNUAL PHYSICAL EXAM: Primary | ICD-10-CM

## 2017-12-12 DIAGNOSIS — Z00.00 ANNUAL PHYSICAL EXAM: ICD-10-CM

## 2017-12-12 LAB
ALBUMIN SERPL BCP-MCNC: 2.7 G/DL
ALP SERPL-CCNC: 54 U/L
ALT SERPL W/O P-5'-P-CCNC: 5 U/L
ANION GAP SERPL CALC-SCNC: 10 MMOL/L
AST SERPL-CCNC: 17 U/L
BASOPHILS # BLD AUTO: 0.02 K/UL
BASOPHILS NFR BLD: 0.3 %
BILIRUB SERPL-MCNC: 0.8 MG/DL
BUN SERPL-MCNC: 15 MG/DL
CALCIUM SERPL-MCNC: 8.9 MG/DL
CHLORIDE SERPL-SCNC: 107 MMOL/L
CO2 SERPL-SCNC: 25 MMOL/L
CREAT SERPL-MCNC: 1.4 MG/DL
DIFFERENTIAL METHOD: ABNORMAL
EOSINOPHIL # BLD AUTO: 0.1 K/UL
EOSINOPHIL NFR BLD: 1.4 %
ERYTHROCYTE [DISTWIDTH] IN BLOOD BY AUTOMATED COUNT: 14 %
EST. GFR  (AFRICAN AMERICAN): 49.7 ML/MIN/1.73 M^2
EST. GFR  (NON AFRICAN AMERICAN): 43 ML/MIN/1.73 M^2
ESTIMATED AVG GLUCOSE: 171 MG/DL
GLUCOSE SERPL-MCNC: 103 MG/DL
HBA1C MFR BLD HPLC: 7.6 %
HCT VFR BLD AUTO: 30.2 %
HGB BLD-MCNC: 10 G/DL
IMM GRANULOCYTES # BLD AUTO: 0.01 K/UL
IMM GRANULOCYTES NFR BLD AUTO: 0.2 %
LYMPHOCYTES # BLD AUTO: 1.9 K/UL
LYMPHOCYTES NFR BLD: 31.6 %
MCH RBC QN AUTO: 30.9 PG
MCHC RBC AUTO-ENTMCNC: 33.1 G/DL
MCV RBC AUTO: 93 FL
MONOCYTES # BLD AUTO: 0.3 K/UL
MONOCYTES NFR BLD: 5.1 %
NEUTROPHILS # BLD AUTO: 3.6 K/UL
NEUTROPHILS NFR BLD: 61.4 %
NRBC BLD-RTO: 0 /100 WBC
PLATELET # BLD AUTO: 202 K/UL
PMV BLD AUTO: 10.9 FL
POTASSIUM SERPL-SCNC: 3.6 MMOL/L
PROT SERPL-MCNC: 7.7 G/DL
RBC # BLD AUTO: 3.24 M/UL
SODIUM SERPL-SCNC: 142 MMOL/L
WBC # BLD AUTO: 5.88 K/UL

## 2017-12-12 PROCEDURE — 99214 OFFICE O/P EST MOD 30 MIN: CPT | Mod: PBBFAC,PO | Performed by: INTERNAL MEDICINE

## 2017-12-12 PROCEDURE — 83036 HEMOGLOBIN GLYCOSYLATED A1C: CPT

## 2017-12-12 PROCEDURE — 99999 PR PBB SHADOW E&M-EST. PATIENT-LVL IV: CPT | Mod: PBBFAC,,, | Performed by: INTERNAL MEDICINE

## 2017-12-12 PROCEDURE — 80053 COMPREHEN METABOLIC PANEL: CPT

## 2017-12-12 PROCEDURE — 85025 COMPLETE CBC W/AUTO DIFF WBC: CPT

## 2017-12-12 PROCEDURE — 99215 OFFICE O/P EST HI 40 MIN: CPT | Mod: S$PBB,,, | Performed by: INTERNAL MEDICINE

## 2017-12-12 PROCEDURE — 36415 COLL VENOUS BLD VENIPUNCTURE: CPT | Mod: PO

## 2017-12-12 RX ORDER — GLIMEPIRIDE 4 MG/1
2 TABLET ORAL
Qty: 30 TABLET | Refills: 2 | Status: SHIPPED | OUTPATIENT
Start: 2017-12-12 | End: 2018-01-11

## 2017-12-12 RX ORDER — ASPIRIN 81 MG/1
81 TABLET ORAL DAILY
Qty: 30 TABLET | Refills: 2 | Status: SHIPPED | OUTPATIENT
Start: 2017-12-12

## 2017-12-12 RX ORDER — AMLODIPINE BESYLATE 5 MG/1
5 TABLET ORAL DAILY
Qty: 30 TABLET | Refills: 2 | Status: SHIPPED | OUTPATIENT
Start: 2017-12-12

## 2017-12-12 RX ORDER — LORAZEPAM 2 MG/ML
CONCENTRATE ORAL
Refills: 0 | COMMUNITY
Start: 2017-11-17

## 2017-12-12 NOTE — PROGRESS NOTES
Subjective:       Patient ID: Bennett Crowe Sr. is a 89 y.o. male.    Chief Complaint: Establish Care (NP; est care; medication; dementia)    HPI   89 y.o. male here for annual physical exam. He is new to me and [facility].        Chronic medical issues:  Alzheimers dementia: he had been in home hospice but family felt they were not helping so they discontinued it.  He has not had any medications for some time.  He was living with his other daughter and was in [facility] .  He now is living with his other daughter and granddaughter.  He is in a wheelchair but walks with walker.  No difficulty swallowing.  No living will.  They are also requesting handicap sticker.  He has bed sores as well as a left pressure ulcer on his heel.  He was recently admitted to the hospital for acute kidney injury from dehydration.      HTN: been off meds while on hospice, no Cp    Dm2: been off meds while on hospice, not checking BS at home, +sores on feet from pressure wounds        Health maintenance    Vaccines: Influenza UTD  Tetanus UTD   Pneumovax unsure (>66yo);  Prevnar unsure  Zoster (>59yo) declines  A1c: due  HIV: declines ages 15-65  Sexual Screening: negative  STD screening: declines  Eye exam:declines annual  DDS exam: due q6 mos.  Prostate: declines  Colonoscopy: declines  DEXA: declines  Lipid disorders: declines        Exercise: wheelchair but also uses walker  Diet: eats well      Of note, history provided by josether as patient has severe AD      Past Medical History:   Diagnosis Date    Dementia     Diabetes mellitus     Hypertension       Past Surgical History:   Procedure Laterality Date    HERNIA REPAIR       Social History     Social History    Marital status:      Spouse name: N/A    Number of children: N/A    Years of education: N/A     Occupational History    Not on file.     Social History Main Topics    Smoking status: Never Smoker    Smokeless tobacco: Never Used    Alcohol use No    Drug use:  No    Sexual activity: Not on file     Other Topics Concern    Not on file     Social History Narrative    No narrative on file     Review of patient's allergies indicates:  No Known Allergies  Mr. Crowe had no medications administered during this visit.            Review of Systems   Constitutional: Negative for activity change, chills and fever.   HENT: Negative for congestion, rhinorrhea, sinus pain, sinus pressure and sore throat.    Eyes: Negative for pain and redness.   Respiratory: Negative for cough and shortness of breath.    Cardiovascular: Negative for chest pain and palpitations.   Gastrointestinal: Negative for abdominal pain, diarrhea, nausea and vomiting.   Genitourinary: Negative for difficulty urinating.   Musculoskeletal: Negative for arthralgias and back pain.   Skin: Positive for wound. Negative for rash.   Neurological: Positive for weakness. Negative for headaches.   Psychiatric/Behavioral: Negative for dysphoric mood and sleep disturbance.     Objective:     Vitals:    12/12/17 1013   BP: (!) 164/100   Pulse: 85   Resp: 16   Temp: 98.6 °F (37 °C)    Body mass index is 18.99 kg/m².  Physical Exam   Constitutional: He appears well-developed and well-nourished.   Difficult to assess due to dementia, frail        HENT:   Head: Normocephalic and atraumatic.   Mouth/Throat: Oropharynx is clear and moist. Mucous membranes are dry.   [place]   Eyes: Conjunctivae are normal. Pupils are equal, round, and reactive to light.   Neck: Normal range of motion. Neck supple. No thyromegaly present.   Cardiovascular: Normal rate, regular rhythm and normal heart sounds.  Exam reveals no gallop and no friction rub.    No murmur heard.  Pulmonary/Chest: Effort normal. He has decreased breath sounds. He has no wheezes. He has no rales.   Decreased BS in bases   Abdominal: Soft. Bowel sounds are normal. There is no tenderness. There is no rebound and no guarding.   Musculoskeletal: Normal range of motion. He  exhibits no edema or tenderness.   Feet:   Right Foot:   Skin Integrity: Positive for skin breakdown (pressure ulcer present over right heel, mild skin breakdown, no bone/muscle visible, decreased sensation, mild serous drainage) and erythema.   Lymphadenopathy:     He has no cervical adenopathy.   Neurological: He is alert.   Alert to person only, follows few commands   Skin: Skin is warm and dry. No rash noted. No erythema.   Psychiatric:   +dementia, flat affect       Assessment:     1. Annual physical exam    2. 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 Uncontrolled type 2 diabetes mellitus without complication, without long-term current use of insulin    4. Decubitus ulcer of right heel, stage 3    5. Dementia associated with other underlying disease with behavioral disturbance    6. Hypertension, unspecified type    7. Encounter to establish care with new doctor      Plan:   1. Annual physical exam  - Comprehensive metabolic panel; Future    2. Late onset Alzheimer's disease without behavioral disturbance  - Ambulatory referral to Home Health  - amLODIPine (NORVASC) 5 MG tablet; Take 1 tablet (5 mg total) by mouth once daily.  Dispense: 30 tablet; Refill: 2  - glimepiride (AMARYL) 4 MG tablet; Take 0.5 tablets (2 mg total) by mouth daily with breakfast.  Dispense: 30 tablet; Refill: 2  - given paperwork for handicap sticker    3. Uncontrolled type 2 diabetes mellitus without complication, without long-term current use of insulin  - glimepiride (AMARYL) 4 MG tablet; Take 0.5 tablets (2 mg total) by mouth daily with breakfast.  Dispense: 30 tablet; Refill: 2  - CBC auto differential; Future  - Hemoglobin A1c; Future    4. Decubitus ulcer of right heel, stage 3  - wound care per [facility]     5. Dementia associated with other underlying disease with behavioral disturbance  - as above    6. Hypertension, unspecified type  - amLODIPine (NORVASC) 5 MG tablet; Take 1 tablet (5 mg total) by mouth  once daily.  Dispense: 30 tablet; Refill: 2  - aspirin (ECOTRIN) 81 MG EC tablet; Take 1 tablet (81 mg total) by mouth once daily.  Dispense: 30 tablet; Refill: 2  - Comprehensive metabolic panel; Future    7. Encounter to establish care with new doctor      Long discussion with family about quality of life and preserving over aggressive management.  If worsening, will re-consider hospice.        Return to clinic in three months for follow up or sooner if dictated by labs or illness.

## 2017-12-12 NOTE — LETTER
December 12, 2017    Bennett Crowe .  [address] [address]             [facility]  [address] [address]  Phone: [phone]  Fax: [phone] To whom it may concern:    Please provide Mr. Bennett Crowe with handicap sticker as he has severe dementia and difficulty walking.      Please let me know if questions.      Sincerely,          Amanda Hernandez MD

## 2017-12-13 ENCOUNTER — TELEPHONE (OUTPATIENT)
Dept: INTERNAL MEDICINE | Facility: CLINIC | Age: 82
End: 2017-12-13

## 2017-12-13 DIAGNOSIS — L89.90 PRESSURE ULCER, UNSPECIFIED LOCATION, UNSPECIFIED ULCER STAGE: ICD-10-CM

## 2017-12-13 DIAGNOSIS — N18.30 STAGE 3 CHRONIC KIDNEY DISEASE: ICD-10-CM

## 2017-12-13 DIAGNOSIS — E86.0 DEHYDRATION: Primary | ICD-10-CM

## 2017-12-13 DIAGNOSIS — R53.81 DEBILITY: ICD-10-CM

## 2017-12-13 DIAGNOSIS — L89.613 DECUBITUS ULCER OF RIGHT HEEL, STAGE 3: Primary | ICD-10-CM

## 2017-12-13 NOTE — TELEPHONE ENCOUNTER
----- Message from Ember Duckworth sent at 12/13/2017  3:37 PM CST -----  Contact: Daughter, Sarah Crowe  [phone]  Patient still need to have all DME that he has. The DME company call to  these items and the only way to continue to keep them is that you send an order to extend it.  All items were originally given by [facility] and now they've discharged him  but she still need all of these items. She need the nurse to call her. [facility]. She want the new order to co-inside with the new DME company [employer].     1. The hospital Bed  2. Oxygen tank also need a portable one in case the lights goes out  3. Wheel Chair  4. Hospital Mary table and chair  5. Hospital table  6 . Asthma machine    Please call to speak with her to be sure this will be taken care of correctly due to her leaving a message earlier with someone.

## 2017-12-13 NOTE — TELEPHONE ENCOUNTER
----- Message from Germania Stark sent at 12/13/2017  4:27 PM CST -----  Contact: Regina w [facility]  [phone]  Requesting a call back in regards to pt Bennett Crowe, Pt will be admitted tomorrow. Please advise

## 2017-12-13 NOTE — TELEPHONE ENCOUNTER
----- Message from Alma Breen sent at 12/13/2017  3:19 PM CST -----  Contact: patient [phone]  Pt's daughter took him out of hospice and they picked up the equipment and his hospital bad. She is asking for a rx from      [facility] [phone] fax

## 2017-12-13 NOTE — TELEPHONE ENCOUNTER
I left her voicemail need nurse to evaluate home equpment needs.    We have started order for hospital bed and wheelchair but not sure if qualifies for other request in other msg.

## 2017-12-13 NOTE — TELEPHONE ENCOUNTER
Please notify patient kidney function has improved.  Continue to drink lots of fluids and avoid all NSAIDs including ibuprofen, aleve, advil.      I have reviewed the rest of the lab results which are normal or stable. Results released to patient portal.      Amanda Hernandez MD

## 2017-12-13 NOTE — TELEPHONE ENCOUNTER
Paper order printed since you told me ok to order.  Please sign and we need to fax to [facility].  Home health will eval for other equipment needs. I left them a message.

## 2017-12-13 NOTE — TELEPHONE ENCOUNTER
She was upset with the hospice company.  They took him off all his meds and is now developing bed sores.  She says his heal sore was bleeding a lot til  She started him back on meds yesterday.    They came today and took the hospital bed, the wheelchair and told him he needed to be on oxygen and his blood sugar was low at 47 earlier today  And needs food.  She working on foods and hydration.  She doesn't notice he is short of breath and needs o2.    she Wants equipment to go to [facility] phone [phone].  Fax [phone] or [phone]     Ok to order bed and wheelchair and maybe return for pulse ox eval?    Order pended for bed and wheelchair .    Thanks haven

## 2017-12-13 NOTE — TELEPHONE ENCOUNTER
I left msg with [facility] (dr solorzano said they are now going out).  I asked aretha hernandez to have nurse evaluate needs and let us know what needs approval on.    Some of the equipment listed we do not order. That was only covered by hospice (table, )

## 2017-12-14 ENCOUNTER — TELEPHONE (OUTPATIENT)
Dept: INTERNAL MEDICINE | Facility: CLINIC | Age: 82
End: 2017-12-14

## 2017-12-20 ENCOUNTER — TELEPHONE (OUTPATIENT)
Dept: INTERNAL MEDICINE | Facility: CLINIC | Age: 82
End: 2017-12-20

## 2017-12-20 DIAGNOSIS — G30.9 ALZHEIMER'S DEMENTIA WITHOUT BEHAVIORAL DISTURBANCE, UNSPECIFIED TIMING OF DEMENTIA ONSET: Primary | ICD-10-CM

## 2017-12-20 DIAGNOSIS — F02.80 ALZHEIMER'S DEMENTIA WITHOUT BEHAVIORAL DISTURBANCE, UNSPECIFIED TIMING OF DEMENTIA ONSET: Primary | ICD-10-CM

## 2017-12-20 NOTE — TELEPHONE ENCOUNTER
----- Message from Lesly Barajas sent at 12/20/2017 11:48 AM CST -----  Contact: pt daughter [phone]  Pt daughter would like a call in regards to get a letter from the doctor stating that the pt has dementia so she can take it to the social security office to get the pt check change to be sent to her address,please advise

## 2017-12-21 NOTE — TELEPHONE ENCOUNTER
Will refer to neurology for evaluation.  They need to evaluate him for dementia as they are specialist and provide proper documentation for the social security office.     Thanks,  Amanda Hernandez MD

## 2017-12-27 ENCOUNTER — TELEPHONE (OUTPATIENT)
Dept: ADMINISTRATIVE | Facility: CLINIC | Age: 82
End: 2017-12-27

## 2017-12-27 NOTE — TELEPHONE ENCOUNTER
Spoke to patient's daughter and appointment offered.  Patient has an appointment with Neurology on tomorrow in [place].   Patient's daughter will try to get an appointment scheduled over there.

## 2017-12-28 ENCOUNTER — OFFICE VISIT (OUTPATIENT)
Dept: NEUROLOGY | Facility: CLINIC | Age: 82
End: 2017-12-28
Payer: MEDICARE

## 2017-12-28 VITALS
DIASTOLIC BLOOD PRESSURE: 63 MMHG | BODY MASS INDEX: 18.96 KG/M2 | WEIGHT: 140 LBS | SYSTOLIC BLOOD PRESSURE: 120 MMHG | HEIGHT: 72 IN | HEART RATE: 97 BPM | RESPIRATION RATE: 16 BRPM

## 2017-12-28 DIAGNOSIS — F02.80 LATE ONSET ALZHEIMER'S DISEASE WITHOUT BEHAVIORAL DISTURBANCE: Primary | ICD-10-CM

## 2017-12-28 DIAGNOSIS — L89.613 DECUBITUS ULCER OF RIGHT HEEL, STAGE 3: ICD-10-CM

## 2017-12-28 DIAGNOSIS — G30.1 LATE ONSET ALZHEIMER'S DISEASE WITHOUT BEHAVIORAL DISTURBANCE: Primary | ICD-10-CM

## 2017-12-28 PROCEDURE — 99205 OFFICE O/P NEW HI 60 MIN: CPT | Mod: S$PBB,,, | Performed by: NURSE PRACTITIONER

## 2017-12-28 PROCEDURE — 99213 OFFICE O/P EST LOW 20 MIN: CPT | Mod: PBBFAC,PN | Performed by: NURSE PRACTITIONER

## 2017-12-28 PROCEDURE — 99999 PR PBB SHADOW E&M-EST. PATIENT-LVL III: CPT | Mod: PBBFAC,,, | Performed by: NURSE PRACTITIONER

## 2017-12-28 NOTE — LETTER
December 28, 2017      Amanda Hernandez MD  [address] [place] [address]           [facility]  [address] [facility] [address] [place] [address]  Phone: [phone]  Fax: [phone]          Patient: Bennett Crowe Sr.   MR Number: [id number]   YOB: 1924   Date of Visit: 12/28/2017       Dear Dr. Amanda Hernandez:    Thank you for referring Bennett Crowe to me for evaluation. Attached you will find relevant portions of my assessment and plan of care.    If you have questions, please do not hesitate to call me. I look forward to following Bennett Crowe along with you.    Sincerely,    Bailee Garcia, NP    Enclosure  CC:  No Recipients    If you would like to receive this communication electronically, please contact [email or web address] or [phone] to request more information on [employer] Link access.    For providers and/or their staff who would like to refer a patient to [facility], please contact us through our one-stop-shop provider referral line, [facility] , at [phone].    If you feel you have received this communication in error or would no longer like to receive these types of communications, please e-mail [email or web address]

## 2017-12-28 NOTE — PROGRESS NOTES
Subjective:       Patient ID: Bennett Crowe Sr. is a 89 y.o. male.    Chief Complaint:  Memory Loss    History of Present Illness  Mr. Crowe is a 89 year old with diagnosis of progressive neurodegenerative dementia- AD type 3 years ago.  He was in hospice for about 1 month but the daughter stopped the services because the family felt they needed more services.  Presently has [facility] for wound care and other services.  Patient was living in [facility] with his daughter ,Diana [place], in [facility] and was participating [facility].  She placed him in the nursing home November 1.He was admitted to the hospital on November 5 with dehydration and renal insufficiency.  He is now living with his daughter, Sarah Crowe in [facility].  She plans on applying to the [facility] program in [place].  Patient has had a progressive decline in cognitive function.  The decline has become worse since moving to nursing home and subsequent hospital stay.      Review of Systems  Review of Systems    Objective:      Neurologic Exam     Mental Status   Oriented to person.   Disoriented to place. Disoriented to city and area.   Disoriented to year, month, date, day and season.   Registration of memory: recalls 0 of 5 words. Recall of objects at 5 minutes: recalls 0 of 5 words. Follows 1 step commands.   Attention: normal. Concentration: decreased.   Speech: speech is normal   Level of consciousness: alert  Knowledge: poor and inconsistent with education. Unable to perform simple calculations.   Unable to name object (0 of 3 animals). Unable to read. Able to repeat. Normal comprehension.   Does not participate in spontaneous conversation.  Will answer questions but with incorrect answers. Does not know the names of his children.         Physical Exam   Psychiatric: His speech is normal.         Assessment:     Progressive neurodegenerative dementia- AD type    Has had a sudden decline after hospital stay and change of  home from [place]. Needs 24 Hour supervision    Plan:     -Reviewed results of cognitive screen and answered questions.  -Discussed dementia- progressive nature of dementia, stages and expectations.  -Diagnostic work up- Has had diagnostic work up before attending [facility] in .  -Discussed safety issues related to MCI- medication management, cooking, managing finances  -Discussed medications for cognitive enhancement- AChEIs and Namenda CR  Will consider Namenda next visit  -Discussed NP testing. Defer  -Patient now lives with his daughter, Sarah Crowe. She is in the sole caregiver.  Will be going to [facility]  Needs 24 hour supervision.

## 2017-12-28 NOTE — PATIENT INSTRUCTIONS
Mrs. Sarah Crowe, the patient's daughter, is the primary caregiver for her father, Bennett Crowe.  He live in her home at [address] [place] [address], in [place].    Patient requires 24 hour supervision.  Has Home Health Service and needs daily wound care.

## 2018-01-02 ENCOUNTER — TELEPHONE (OUTPATIENT)
Dept: INTERNAL MEDICINE | Facility: CLINIC | Age: 83
End: 2018-01-02

## 2018-01-02 NOTE — TELEPHONE ENCOUNTER
Informed Naye that Dr. Hernandez is out of office today but I will give this message to her tomorrow.

## 2018-01-02 NOTE — TELEPHONE ENCOUNTER
----- Message from Ember Duckworth sent at 1/2/2018  1:36 PM CST -----  Contact: Naye with [facility]   [phone]  Naye is requesting you to get an appointment for patient to see  wound care for his pressure sore on both heals.  The right is very bad.

## 2018-01-03 NOTE — TELEPHONE ENCOUNTER
Please place orders for wound care with Cecilia Fisher or Teri Freitas for pressure sores to both heals.

## 2018-01-08 ENCOUNTER — TELEPHONE (OUTPATIENT)
Dept: INTERNAL MEDICINE | Facility: CLINIC | Age: 83
End: 2018-01-08

## 2018-01-08 ENCOUNTER — TELEPHONE (OUTPATIENT)
Dept: ADMINISTRATIVE | Facility: CLINIC | Age: 83
End: 2018-01-08

## 2018-01-08 DIAGNOSIS — L89.613 DECUBITUS ULCER OF RIGHT HEEL, STAGE 3: Primary | ICD-10-CM

## 2018-01-08 DIAGNOSIS — G30.1 LATE ONSET ALZHEIMER'S DISEASE WITHOUT BEHAVIORAL DISTURBANCE: ICD-10-CM

## 2018-01-08 DIAGNOSIS — F02.80 LATE ONSET ALZHEIMER'S DISEASE WITHOUT BEHAVIORAL DISTURBANCE: ICD-10-CM

## 2018-01-08 NOTE — PATIENT INSTRUCTIONS
Home health is notifying doctor of following:      Caregiver HAS NOT BEEN COMPLYING WITH ORDERS. SHE HAS NOT BEEN TAKING PATIENTS BLOOD SUGAR AS PRESCRIBED.     HAS NOT BEEN FOLLOWING WOUND CARE ORDERS ON NON VISIT DAYS.     The wound to the right heel is necrotic and deeper than last documentation. It also has a moderate amount of yellow drainage and has a foul odor.     PATIENTs sacral ulcer has also increases in size. Lastly, PATIENT has a inadequate appetite therefore nutrition status is poor. Vital signs were stable and patient did not appear to be in any distress or pain.     Please inform of any new orders.    Thanks    Edelmira with [facility] @ [phone]

## 2018-01-08 NOTE — TELEPHONE ENCOUNTER
----- Message from Iza Mcneil sent at 1/8/2018 10:28 AM CST -----  Contact: Sarah Daughter [phone]  Sarah would like to speak with the nurse regarding the pts bed sores and getting a hospital mattress,please call

## 2018-01-09 ENCOUNTER — TELEPHONE (OUTPATIENT)
Dept: ADMINISTRATIVE | Facility: CLINIC | Age: 83
End: 2018-01-09

## 2018-01-09 DIAGNOSIS — K59.00 CONSTIPATION, UNSPECIFIED CONSTIPATION TYPE: Primary | ICD-10-CM

## 2018-01-09 RX ORDER — BISACODYL 10 MG
10 SUPPOSITORY, RECTAL RECTAL DAILY PRN
Qty: 12 SUPPOSITORY | Refills: 1 | Status: SHIPPED | OUTPATIENT
Start: 2018-01-09

## 2018-01-09 NOTE — PATIENT INSTRUCTIONS
Home health is requesting orders:      1.Patient has not had a Bowel movement in one week.    2.Needs air mattress order.    3. Duoderm for sacrum is tearing of patients skin. Need new wound care orders till patient sees wound care clinic on 1/18/18.      Thanks    Britnee Alami [facility]-[phone]

## 2018-01-10 NOTE — TELEPHONE ENCOUNTER
"Called and had long discussion with daughter Sarah.  Neurology recommend [facility] but she states she is unable to get him in because they need last 3 months bank statements and her sister in [place] is refusing to give the information needed.  She notes she is unable to care for him at home and that "he has good days and bad days."  She is working on trying to get him into a nursing home.  I reinforced using the  to assist with this.  Explained we are here to help in any way needed.    Amanda Hernandez MD    "

## 2018-01-13 ENCOUNTER — INITIAL CONSULT (OUTPATIENT)
Dept: WOUND CARE | Facility: CLINIC | Age: 83
End: 2018-01-13
Payer: MEDICARE

## 2018-01-13 VITALS
SYSTOLIC BLOOD PRESSURE: 137 MMHG | HEART RATE: 112 BPM | WEIGHT: 118.5 LBS | BODY MASS INDEX: 16.05 KG/M2 | DIASTOLIC BLOOD PRESSURE: 82 MMHG | HEIGHT: 72 IN | TEMPERATURE: 97 F

## 2018-01-13 DIAGNOSIS — R26.89 DECREASED MOBILITY: ICD-10-CM

## 2018-01-13 DIAGNOSIS — L89.610 DECUBITUS ULCER OF RIGHT HEEL, UNSTAGEABLE: Primary | ICD-10-CM

## 2018-01-13 DIAGNOSIS — S70.222A: ICD-10-CM

## 2018-01-13 DIAGNOSIS — L89.42: ICD-10-CM

## 2018-01-13 PROBLEM — L89.222 DECUBITUS ULCER OF LEFT HIP, STAGE 2: Status: ACTIVE | Noted: 2018-01-13

## 2018-01-13 PROCEDURE — 11042 DBRDMT SUBQ TIS 1ST 20SQCM/<: CPT | Mod: S$PBB,,, | Performed by: NURSE PRACTITIONER

## 2018-01-13 PROCEDURE — 99999 PR PBB SHADOW E&M-EST. PATIENT-LVL IV: CPT | Mod: PBBFAC,,, | Performed by: NURSE PRACTITIONER

## 2018-01-13 PROCEDURE — 99214 OFFICE O/P EST MOD 30 MIN: CPT | Mod: PBBFAC | Performed by: NURSE PRACTITIONER

## 2018-01-13 PROCEDURE — 11042 DBRDMT SUBQ TIS 1ST 20SQCM/<: CPT | Mod: PBBFAC | Performed by: NURSE PRACTITIONER

## 2018-01-13 PROCEDURE — 99203 OFFICE O/P NEW LOW 30 MIN: CPT | Mod: 25,S$PBB,, | Performed by: NURSE PRACTITIONER

## 2018-01-13 NOTE — LETTER
January 13, 2018      Amanda Hernandez MD  [address] [facility]  [place] [address]           [facility]  [address]  [facility] [address]  Phone: [phone]          Patient: Bennett Crowe Sr.   MR Number: [id number]   YOB: 1924   Date of Visit: 1/13/2018       Dear Dr. Amanda Hernandez:    Thank you for referring Bennett Crowe to me for evaluation. Attached you will find relevant portions of my assessment and plan of care.    If you have questions, please do not hesitate to call me. I look forward to following Bennett Crowe along with you.    Sincerely,    Christelle Fisher, ROSAS    Enclosure  CC:  No Recipients    If you would like to receive this communication electronically, please contact [email or web address] or [phone] to request more information on [employer] Link access.    For providers and/or their staff who would like to refer a patient to [facility], please contact us through our one-stop-shop provider referral line, [facility], at [phone].    If you feel you have received this communication in error or would no longer like to receive these types of communications, please e-mail [email or web address]

## 2018-01-13 NOTE — PROGRESS NOTES
Subjective:       Patient ID: Bennett Crowe Sr. is a 89 y.o. male.    Chief Complaint: Wound Check    HPI   This is a 89 year old male referred for evaluation and management of multiple decubiti to the heel, hip, and sacral region.  The sacral and heel wounds have been present since the beginning of November 2017.  The hip wound was discovered a week ago.  His medical history is significant for advanced dementia and all information is provided by his daughter who is his primary caregiver.  He also has type II diabetes and malnutrition.  He has home health services through [facility] and they are seeing him twice weekly.  He has a silver foam dressing on the right heel wound, a mepore island dressing on the left hip wound and xeroform gauze on the sacral wound.  The patient is in a hospital bed and a mattress overlay is on order.  The daughter reports the wounds are getting worse.  He is afebrile.  The daughter denies increased redness, swelling or purulent drainage.  He appears to have some discomfort when the wounds are cleaned and dressed but is not very verbal.    Review of Systems   Unable to perform ROS: Dementia       Objective:      Physical Exam   Constitutional: He is oriented to person, place, and time. He appears well-developed and well-nourished. No distress.   HENT:   Head: Normocephalic and atraumatic.   Mouth/Throat: Oropharynx is clear and moist.   Eyes: Conjunctivae and EOM are normal. Pupils are equal, round, and reactive to light. Right eye exhibits no discharge. Left eye exhibits no discharge. No scleral icterus.   Neck: Normal range of motion. Neck supple. No JVD present. No tracheal deviation present. No thyromegaly present.   Cardiovascular: Normal rate, regular rhythm, normal heart sounds and intact distal pulses.  Exam reveals no gallop and no friction rub.    No murmur heard.  Pulmonary/Chest: Effort normal and breath sounds normal. No respiratory distress. He has no wheezes. He has no rales.    Abdominal: Soft. Bowel sounds are normal. He exhibits no distension. There is no tenderness.   Musculoskeletal: Normal range of motion. He exhibits no edema or tenderness.        Legs:       Feet:    Neurological: He is alert and oriented to person, place, and time.   Skin: Skin is warm and dry. No rash noted. He is not diaphoretic. No erythema.   Psychiatric: He has a normal mood and affect. His behavior is normal. Judgment and thought content normal.   Nursing note and vitals reviewed.      ..  Hemoglobin A1C   Date Value Ref Range Status   12/12/2017 7.6 (H) 4.0 - 5.6 % Final     Comment:     According to ADA guidelines, hemoglobin A1c <7.0% represents  optimal control in non-pregnant diabetic patients. Different  metrics may apply to specific patient populations.   Standards of Medical Care in Diabetes-2016.  For the purpose of screening for the presence of diabetes:  <5.7%     Consistent with the absence of diabetes  5.7-6.4%  Consistent with increasing risk for diabetes   (prediabetes)  >or=6.5%  Consistent with diabetes  Currently, no consensus exists for use of hemoglobin A1c  for diagnosis of diabetes for children.  This Hemoglobin A1c assay has significant interference with fetal   hemoglobin   (HbF). The results are invalid for patients with abnormal amounts of   HbF,   including those with known Hereditary Persistence   of Fetal Hemoglobin. Heterozygous hemoglobin variants (HbAS, HbAC,   HbAD, HbAE, HbA2) do not significantly interfere with this assay;   however, presence of multiple variants in a sample may impact the %   interference.     11/06/2017 11.6 (H) 4.0 - 5.6 % Final     Comment:     According to ADA guidelines, hemoglobin A1c <7.0% represents  optimal control in non-pregnant diabetic patients. Different  metrics may apply to specific patient populations.   Standards of Medical Care in Diabetes-2016.  For the purpose of screening for the presence of diabetes:  <5.7%     Consistent with the  absence of diabetes  5.7-6.4%  Consistent with increasing risk for diabetes   (prediabetes)  >or=6.5%  Consistent with diabetes  Currently, no consensus exists for use of hemoglobin A1c  for diagnosis of diabetes for children.  This Hemoglobin A1c assay has significant interference with fetal   hemoglobin   (HbF). The results are invalid for patients with abnormal amounts of   HbF,   including those with known Hereditary Persistence   of Fetal Hemoglobin. Heterozygous hemoglobin variants (HbAS, HbAC,   HbAD, HbAE, HbA2) do not significantly interfere with this assay;   however, presence of multiple variants in a sample may impact the %   interference.       ..  Lab Results   Component Value Date    ALBUMIN 2.7 (L) 12/12/2017       Procedure: Bennett was seen in the clinic room and placed in the supine position on the treatment table. Attention was directed to the ulcer which was located on the right heel, where an wound measuring 2.0x3.4cm cm is noted. The wound was covered with 100% black eschar and a mild callused rim. No acute signs of infection were noted. The wound was non-tender. The wound was prepped with alcohol. Utilizing a #15 scalpel and pickups, I debrided the wound full-thickness through skin and subcutaneous tissue to excise viable and nonviable tissue inside and outside the wound margins. The patient tolerated well. Because of a lack of sensation, anesthesia was not necessary. The wound was flushed with wound . There was minimal bleeding with debridement which was well controlled with direct pressure. The wound was then dressed with medihoney gel. The patient tolerated the procedure well.  Assessment:       1. Decubitus ulcer of right heel, unstageable    2. Blister (nonthermal), left hip, initial encounter    3. Pressure ulcer of contiguous region involving back and buttock, stage 2, unspecified laterality    4. Decreased mobility        Plan:           Refer to dietician for nutrition  optimization.  Cleanse wounds with wound cleanser.  Apply Santyl to right heel ulcer, cover with gauze and secure with roll gauze.  Change dressing daily.  Apply skin prep to periwound area left hip and sacral wounds.  Apply aquacel or allevyn bordered foam dressing to left hip and sacral wounds as these are waterproof and the patient is incontinent.  Change these dressings three times weekly and as needed if soiled.  Turn patient every 2 hours.  Keep pressure off of bony prominences at all times.  Return to clinic in 3-4 weeks.  [facility] notified of orders via email.  We will ask them to see the patient three times weekly.    Right heel pre-debridement    Right heel post-debridement    Left heel    Left hip blisters    Stage II sacral decubiti

## 2018-01-13 NOTE — PATIENT INSTRUCTIONS
Preventing Pressure Sores (Ulcers)  Pressure sores can develop quickly, even in healthy skin. Thats why taking steps to prevent them is so important. Taking pressure off the skin is the first step. That means changing positions often, supporting the body, and avoiding rubbing and sliding. Keeping the skin clean, eating well, and stretching the joints and muscles can also help prevent pressure sores.    Change Positions Often  Changing positions often allows blood to get to the skin and keep the tissue healthy.  IN A CHAIR  · Shift weight from side to side at least once an hour--every 15 minutes if you can.  · Ask about pads and cushions that reduce pressure on the skin.  IN BED  · Change positions at least every 2 hours, more often if you can.  · Use lightweight sheets and blankets to reduce pressure from above.  · Ask about special pads and mattresses that spread pressure over a larger area of the body.  Support the Body  Supporting the body spreads pressure over a larger area.  IN A CHAIR  · Lightly cushion the back and buttocks. Dont use donut-type cushions. They can cut off the blood supply to the skin.  · Lightly pad the footrest on a wheelchair.  IN BED  · When lying on your back, put pillows under the lower calves and ankles. Keep the elbows slightly bent.  · When lying on your side, put pillows behind the back, between the legs, and between the ankles. Keep elbows and knees slightly bent.  Avoid Rubbing/Sliding  Rubbing (friction) and sliding (shear) cause the skin to break down more easily.  IN A CHAIR  · Keep the feet on a footrest, so the thighs are horizontal. This keeps the buttocks from sliding forward.  · Support the shoulder blades and back with a pillow.  IN BED  · Keep the sheets smooth, dry, and free of crumbs. Use a sheepskin pad to prevent rubbing.  · Keep the feet and head slightly raised to avoid sliding. Dont raise the head more than 30 degrees, however, except to allow sitting up to  eat.  Keep the Skin Clean    Keeping the skin clean and soft also helps prevent pressure sores.  · Clean the skin of sweat, urine, or wound drainage    · Apply protective creams and use absorbent pads for someone who lacks bladder or bowel control.  Provide Good Food and Movement  Someone whos in a bed or a wheelchair most or all of the time needs to:  · Eat enough calories to maintain a stable weight.  · Get plenty of protein, vitamins, and iron, and drink lots of fluids each day.  · Get out of the bed or chair as much as possible.   © 8269-9281 [employer], [address] [place] [address]. All rights reserved. This information is not intended as a substitute for professional medical care. Always follow your healthcare professional's instructions.    Nutrition and MyPlate: Protein Foods   This group includes foods that are high in protein. Protein helps the body build new cells and keeps tissues healthy. Most Americans get enough protein without even trying. It can be harder for vegetarians, but plenty of non-meat foods are rich in protein, too. Its best to get protein from a variety of sources.   Nutrient-Rich Choices   Theres a lot more to this food group than just meat and beans. It also includes nuts, seeds, and eggs. There are all sorts of nutrient-rich choices:   Chicken and turkey with the skin removed   Fish and shellfish   Lean beef, pork, or lamb (without visible fat)   Soy products, such as tofu, soybeans (edamame), tempeh, or soymilk   Black beans, kidney beans, brown beans, chickpeas (garbanzo beans), and lentils (Note: beans and peas count as both a protein and a vegetable)   Peanuts, almonds, walnuts, sesame seeds, and sunflower  seeds, as well as foods made from these (such as peanut butter or tahini)  Eggs and foods made with eggs (such as quiche or frittata)  What Makes Meat and Beans Less Healthy?   Fatty meat is not healthy. Before you cook meat, trim off all the fat you can  see. Chicken and turkey skin is also high in fat, and should be removed before cooking.   Breading and frying make food less healthy. This includes dishes like fried chicken, fried fish, and refried beans.   Sausage and lunch meats tend to be high in fat and salt. Buy low-fat, low-sodium versions.  One Small Change   Make a meal that includes a non-meat source of protein (such as tofu, lentils, or any other food listed above). Have a better idea? Write it here:   _____________________________________________________________   © 2856-7145 [employer], [address] [place] [address]. All rights reserved. This information is not intended as a substitute for professional medical care. Always follow your healthcare professional's instructions.    Call [phone] to schedule an appointment with a dietician early next week.    Cleanse right heel wound with wound cleanser.  Apply Santyl daily to heel wound, cover with gauze and secure with roll gauze.  Change daily.    Cleanse wounds with wound cleanser.  Apply skin prep to periwound area left hip and sacral wounds.  Apply aquacel or allevyn bordered foam dressing to left hip and sacral wounds as these are waterproof and the patient is incontinent.  Change these dressings three times weekly and as needed if soiled.  Turn patient every 2 hours.  Keep pressure off of bony prominences at all times.

## 2018-01-23 ENCOUNTER — TELEPHONE (OUTPATIENT)
Dept: INTERNAL MEDICINE | Facility: CLINIC | Age: 83
End: 2018-01-23

## 2018-01-23 NOTE — TELEPHONE ENCOUNTER
----- Message from Ember Duckworth sent at 1/23/2018  3:11 PM CST -----  Contact: Leatha Smith [phone]  Patient would like for you to give her a call.

## 2018-01-24 ENCOUNTER — TELEPHONE (OUTPATIENT)
Dept: INTERNAL MEDICINE | Facility: CLINIC | Age: 83
End: 2018-01-24

## 2018-01-24 DIAGNOSIS — F02.80 LATE ONSET ALZHEIMER'S DISEASE WITHOUT BEHAVIORAL DISTURBANCE: ICD-10-CM

## 2018-01-24 DIAGNOSIS — L89.42: ICD-10-CM

## 2018-01-24 DIAGNOSIS — G30.1 LATE ONSET ALZHEIMER'S DISEASE WITHOUT BEHAVIORAL DISTURBANCE: ICD-10-CM

## 2018-01-24 DIAGNOSIS — R26.89 DECREASED MOBILITY: ICD-10-CM

## 2018-01-24 DIAGNOSIS — F02.818 DEMENTIA ASSOCIATED WITH OTHER UNDERLYING DISEASE WITH BEHAVIORAL DISTURBANCE: Primary | ICD-10-CM

## 2018-01-24 NOTE — TELEPHONE ENCOUNTER
----- Message from Arnel Benitez sent at 1/24/2018  9:38 AM CST -----  Contact: Angely with [place] [phone]  Need order for hospice     Please advise

## 2018-03-09 ENCOUNTER — TELEPHONE (OUTPATIENT)
Dept: INTERNAL MEDICINE | Facility: CLINIC | Age: 83
End: 2018-03-09

## 2018-03-09 NOTE — TELEPHONE ENCOUNTER
Spoke with Sarah Crowe, pt's daughter. C/G stated that the pt passed on 18.   I entered  & notified  patients